# Patient Record
Sex: FEMALE | Race: WHITE | HISPANIC OR LATINO | Employment: FULL TIME | URBAN - NONMETROPOLITAN AREA
[De-identification: names, ages, dates, MRNs, and addresses within clinical notes are randomized per-mention and may not be internally consistent; named-entity substitution may affect disease eponyms.]

---

## 2020-08-15 ENCOUNTER — HOSPITAL ENCOUNTER (EMERGENCY)
Facility: HOSPITAL | Age: 60
Discharge: HOME | End: 2020-08-15
Attending: EMERGENCY MEDICINE | Admitting: EMERGENCY MEDICINE
Payer: COMMERCIAL

## 2020-08-15 ENCOUNTER — APPOINTMENT (OUTPATIENT)
Dept: RADIOLOGY | Facility: HOSPITAL | Age: 60
End: 2020-08-15
Payer: COMMERCIAL

## 2020-08-15 VITALS
SYSTOLIC BLOOD PRESSURE: 140 MMHG | HEART RATE: 86 BPM | TEMPERATURE: 98.2 F | DIASTOLIC BLOOD PRESSURE: 94 MMHG | RESPIRATION RATE: 20 BRPM | OXYGEN SATURATION: 98 %

## 2020-08-15 DIAGNOSIS — R06.02 SHORTNESS OF BREATH: Primary | ICD-10-CM

## 2020-08-15 DIAGNOSIS — J45.31 MILD PERSISTENT ASTHMA WITH EXACERBATION: ICD-10-CM

## 2020-08-15 LAB
ALBUMIN SERPL-MCNC: 4.3 G/DL (ref 3.5–5.3)
ALP SERPL-CCNC: 70 U/L (ref 46–118)
ALT SERPL-CCNC: 20 U/L (ref 7–52)
ANION GAP SERPL CALC-SCNC: 10 MMOL/L (ref 3–11)
AST SERPL-CCNC: 22 U/L
BASOPHILS # BLD AUTO: 0.1 10*3/UL
BASOPHILS NFR BLD AUTO: 1 % (ref 0–2)
BILIRUB SERPL-MCNC: 0.67 MG/DL (ref 0.2–1.4)
BUN SERPL-MCNC: 20 MG/DL (ref 7–25)
CALCIUM ALBUM COR SERPL-MCNC: 8.9 MG/DL (ref 8.6–10.3)
CALCIUM SERPL-MCNC: 9.1 MG/DL (ref 8.6–10.3)
CHLORIDE SERPL-SCNC: 105 MMOL/L (ref 98–107)
CO2 SERPL-SCNC: 27 MMOL/L (ref 21–32)
CREAT SERPL-MCNC: 0.84 MG/DL (ref 0.6–1.1)
EOSINOPHIL # BLD AUTO: 0.8 10*3/UL
EOSINOPHIL NFR BLD AUTO: 11 % (ref 0–3)
ERYTHROCYTE [DISTWIDTH] IN BLOOD BY AUTOMATED COUNT: 13.4 % (ref 11.5–14)
GFR SERPL CREATININE-BSD FRML MDRD: 77 ML/MIN/1.73M*2
GLUCOSE SERPL-MCNC: 107 MG/DL (ref 70–105)
HCT VFR BLD AUTO: 40 % (ref 34–45)
HGB BLD-MCNC: 14 G/DL (ref 11.5–15.5)
LYMPHOCYTES # BLD AUTO: 2.7 10*3/UL
LYMPHOCYTES NFR BLD AUTO: 36 % (ref 11–47)
MCH RBC QN AUTO: 32.4 PG (ref 28–33)
MCHC RBC AUTO-ENTMCNC: 35 G/DL (ref 32–36)
MCV RBC AUTO: 92.6 FL (ref 81–97)
MONOCYTES # BLD AUTO: 0.6 10*3/UL
MONOCYTES NFR BLD AUTO: 9 % (ref 3–11)
NEUTROPHILS # BLD AUTO: 3.2 10*3/UL
NEUTROPHILS NFR BLD AUTO: 44 % (ref 41–81)
PLATELET # BLD AUTO: 197 10*3/UL (ref 140–350)
PMV BLD AUTO: 9.5 FL (ref 6.9–10.8)
POTASSIUM SERPL-SCNC: 3.6 MMOL/L (ref 3.5–5.1)
PROT SERPL-MCNC: 7 G/DL (ref 6–8.3)
RBC # BLD AUTO: 4.32 10*6/ΜL (ref 3.7–5.3)
SARS-COV-2 RDRP RESP QL NAA+PROBE: NEGATIVE
SODIUM SERPL-SCNC: 142 MMOL/L (ref 135–145)
WBC # BLD AUTO: 7.4 10*3/UL (ref 4.5–10.5)

## 2020-08-15 PROCEDURE — 6360000200 HC RX 636 W HCPCS (ALT 250 FOR IP): Performed by: EMERGENCY MEDICINE

## 2020-08-15 PROCEDURE — 93005 ELECTROCARDIOGRAM TRACING: CPT | Performed by: EMERGENCY MEDICINE

## 2020-08-15 PROCEDURE — 80053 COMPREHEN METABOLIC PANEL: CPT | Performed by: EMERGENCY MEDICINE

## 2020-08-15 PROCEDURE — 87635 SARS-COV-2 COVID-19 AMP PRB: CPT | Performed by: EMERGENCY MEDICINE

## 2020-08-15 PROCEDURE — 96374 THER/PROPH/DIAG INJ IV PUSH: CPT

## 2020-08-15 PROCEDURE — 99284 EMERGENCY DEPT VISIT MOD MDM: CPT | Mod: 25 | Performed by: EMERGENCY MEDICINE

## 2020-08-15 PROCEDURE — 85025 COMPLETE CBC W/AUTO DIFF WBC: CPT | Performed by: EMERGENCY MEDICINE

## 2020-08-15 PROCEDURE — 71045 X-RAY EXAM CHEST 1 VIEW: CPT

## 2020-08-15 PROCEDURE — 94640 AIRWAY INHALATION TREATMENT: CPT

## 2020-08-15 PROCEDURE — C9803 HOPD COVID-19 SPEC COLLECT: HCPCS | Performed by: EMERGENCY MEDICINE

## 2020-08-15 PROCEDURE — 99283 EMERGENCY DEPT VISIT LOW MDM: CPT | Performed by: EMERGENCY MEDICINE

## 2020-08-15 PROCEDURE — 36415 COLL VENOUS BLD VENIPUNCTURE: CPT | Performed by: EMERGENCY MEDICINE

## 2020-08-15 PROCEDURE — 6370000100 HC RX 637 (ALT 250 FOR IP): Performed by: EMERGENCY MEDICINE

## 2020-08-15 RX ORDER — METHYLPREDNISOLONE 4 MG/1
4 TABLET ORAL
Status: DISCONTINUED | OUTPATIENT
Start: 2020-08-17 | End: 2020-08-15 | Stop reason: HOSPADM

## 2020-08-15 RX ORDER — ALBUTEROL SULFATE 90 UG/1
2 INHALANT RESPIRATORY (INHALATION) EVERY 6 HOURS PRN
COMMUNITY

## 2020-08-15 RX ORDER — METHYLPREDNISOLONE 4 MG/1
4 TABLET ORAL 2 TIMES DAILY
Status: DISCONTINUED | OUTPATIENT
Start: 2020-08-19 | End: 2020-08-15 | Stop reason: HOSPADM

## 2020-08-15 RX ORDER — METHYLPREDNISOLONE 4 MG/1
4 TABLET ORAL
Status: DISCONTINUED | OUTPATIENT
Start: 2020-08-18 | End: 2020-08-15 | Stop reason: HOSPADM

## 2020-08-15 RX ORDER — AZITHROMYCIN 250 MG
1 TABLET ORAL ONCE
Status: COMPLETED | OUTPATIENT
Start: 2020-08-15 | End: 2020-08-15

## 2020-08-15 RX ORDER — ALBUTEROL SULFATE 90 UG/1
2 INHALANT RESPIRATORY (INHALATION) EVERY 4 HOURS PRN
Status: DISCONTINUED | OUTPATIENT
Start: 2020-08-15 | End: 2020-08-15 | Stop reason: HOSPADM

## 2020-08-15 RX ORDER — IPRATROPIUM BROMIDE AND ALBUTEROL SULFATE 2.5; .5 MG/3ML; MG/3ML
3 SOLUTION RESPIRATORY (INHALATION) ONCE
Status: COMPLETED | OUTPATIENT
Start: 2020-08-15 | End: 2020-08-15

## 2020-08-15 RX ORDER — METHYLPREDNISOLONE 4 MG/1
8 TABLET ORAL ONCE
Status: DISCONTINUED | OUTPATIENT
Start: 2020-08-16 | End: 2020-08-15 | Stop reason: HOSPADM

## 2020-08-15 RX ORDER — METHYLPREDNISOLONE 4 MG/1
4 TABLET ORAL ONCE
Status: DISCONTINUED | OUTPATIENT
Start: 2020-08-20 | End: 2020-08-15 | Stop reason: HOSPADM

## 2020-08-15 RX ORDER — METHYLPREDNISOLONE 4 MG/1
TABLET ORAL
Status: DISCONTINUED
Start: 2020-08-15 | End: 2020-08-15 | Stop reason: HOSPADM

## 2020-08-15 RX ORDER — IPRATROPIUM BROMIDE AND ALBUTEROL SULFATE 2.5; .5 MG/3ML; MG/3ML
SOLUTION RESPIRATORY (INHALATION)
Status: DISCONTINUED
Start: 2020-08-15 | End: 2020-08-15 | Stop reason: HOSPADM

## 2020-08-15 RX ORDER — METHYLPREDNISOLONE 4 MG/1
4 TABLET ORAL
Status: DISCONTINUED | OUTPATIENT
Start: 2020-08-16 | End: 2020-08-15 | Stop reason: HOSPADM

## 2020-08-15 RX ADMIN — METHYLPREDNISOLONE SODIUM SUCCINATE 80 MG: 125 INJECTION, POWDER, FOR SOLUTION INTRAMUSCULAR; INTRAVENOUS at 05:35

## 2020-08-15 RX ADMIN — Medication 1 PACKAGE: at 05:40

## 2020-08-15 RX ADMIN — ALBUTEROL SULFATE 2 PUFF: 90 AEROSOL, METERED RESPIRATORY (INHALATION) at 04:21

## 2020-08-15 RX ADMIN — IPRATROPIUM BROMIDE AND ALBUTEROL SULFATE 3 ML: .5; 3 SOLUTION RESPIRATORY (INHALATION) at 05:35

## 2020-08-15 ASSESSMENT — ENCOUNTER SYMPTOMS
FEVER: 0
NAUSEA: 0
DIZZINESS: 0
COUGH: 1
VOMITING: 0
SHORTNESS OF BREATH: 1
TROUBLE SWALLOWING: 0
BACK PAIN: 0
CHILLS: 0
PALPITATIONS: 0
SORE THROAT: 1
ABDOMINAL PAIN: 0

## 2020-08-15 NOTE — ED PROVIDER NOTES
HPI:  Chief Complaint   Patient presents with   • Shortness of Breath     started 1 hour ago       Patient comes in [facility] for evaluation of difficulty breathing.  She was awakened from sleep about 1 to 1-1/2 hours ago with sudden onset of shortness of breath.  She has had some coughing.  She used her albuterol inhaler which did not help.  He is unsure whether she has had a fever or not but has felt hot.  She had a similar episode a few days ago and had her doctor call in a prescription for albuterol inhaler which she picked up in [place].  She has a slight sore throat now.  There is been no nasal congestion or stuffiness.  She has some soreness in the ribs with cough but no other chest pain or heaviness.  Has been no abdominal pain, vomiting, or diarrhea.  There are no known exposures to COVID-19 patients.  No recent lower extremity swelling.  There is no known trauma or injury.  She feels she may have been exposed to some smoke earlier.          HISTORY:  Past Medical History:   Diagnosis Date   • Asthma        No past surgical history on file.    No family history on file.    Social History     Tobacco Use   • Smoking status: Never Smoker   • Smokeless tobacco: Never Used   Substance Use Topics   • Alcohol use: Yes     Alcohol/week: 3.0 standard drinks     Types: 3 Shots of liquor per week   • Drug use: Never         ROS:  Review of Systems   Constitutional: Negative for chills and fever.   HENT: Positive for sore throat. Negative for congestion and trouble swallowing.    Respiratory: Positive for cough and shortness of breath.    Cardiovascular: Negative for chest pain, palpitations and leg swelling.   Gastrointestinal: Negative for abdominal pain, nausea and vomiting.   Musculoskeletal: Negative for back pain.   Skin: Negative for rash.   Neurological: Negative for dizziness.       PE:  ED Triage Vitals   Temp Heart Rate Resp BP SpO2   08/15/20 0414 08/15/20 0413 08/15/20 0413 08/15/20 0413 08/15/20 0413    36.8 °C (98.2 °F) 78 24 (!) 167/116 100 %      Temp Source Heart Rate Source Patient Position BP Location FiO2 (%)   08/15/20 0414 -- 08/15/20 0537 -- --   Oral  Head of bed 30 degrees or higher         Physical Exam  Vitals signs and nursing note reviewed.   Constitutional:       Appearance: She is obese.      Comments: Slightly anxious appearing   HENT:      Head: Normocephalic and atraumatic.      Mouth/Throat:      Mouth: Mucous membranes are moist.   Eyes:      Extraocular Movements: Extraocular movements intact.      Pupils: Pupils are equal, round, and reactive to light.   Neck:      Musculoskeletal: Normal range of motion.   Cardiovascular:      Rate and Rhythm: Normal rate.   Pulmonary:      Effort: Pulmonary effort is normal. No respiratory distress.      Breath sounds: No stridor. Examination of the right-upper field reveals rhonchi. Examination of the left-upper field reveals rhonchi. Examination of the right-middle field reveals rhonchi. Examination of the left-middle field reveals rhonchi. Rhonchi present.   Chest:      Chest wall: No deformity or tenderness.   Abdominal:      Palpations: Abdomen is soft.      Tenderness: There is no abdominal tenderness.   Musculoskeletal:      Right lower leg: No edema.      Left lower leg: No edema.   Skin:     General: Skin is warm and dry.      Findings: No rash.   Neurological:      General: No focal deficit present.      Mental Status: She is alert and oriented to person, place, and time.   Psychiatric:         Mood and Affect: Mood is anxious.         ED LABS:  Labs Reviewed   CBC WITH AUTO DIFFERENTIAL - Abnormal       Result Value    WBC 7.4      RBC 4.32      Hemoglobin 14.0      Hematocrit 40.0      MCV 92.6      MCH 32.4      MCHC 35.0      RDW 13.4      Platelets 197      MPV 9.5      Neutrophils% 44      Lymphocytes% 36      Monocytes% 9      Eosinophils% 11 (*)     Basophils% 1      Neutrophils Absolute 3.20      Lymphocytes Absolute 2.70       Monocytes Absolute 0.60      Eosinophils Absolute 0.80      Basophils Absolute 0.10     COMPREHENSIVE METABOLIC PANEL - Abnormal    Sodium 142      Potassium 3.6      Chloride 105      CO2 27      Anion Gap 10      BUN 20      Creatinine 0.84      Glucose 107 (*)     Calcium 9.1      AST 22      ALT (SGPT) 20      Alkaline Phosphatase 70      Total Protein 7.0      Albumin 4.3      Total Bilirubin 0.67      eGFR 77      Corrected Calcium 8.9      Narrative:     Estimated GFR calculated using the 2009 CKD-EPI creatinine equation.   COVID-19 MOLECULAR (PCR) - Normal    COVID-19 PCR Negative      Narrative:     A negative result may not rule out COVID-19 in the patient, as the sensitivity of the test depends on the timing of the specimen collection, type of specimen, and the quality of the specimen. Result should be correlated with patient's history and clinical presentation.    Negative results should be treated as presumptive and, if inconsistent with clinical signs and symptoms or necessary for patient management, should be tested with an alternative molecular assay.    Performance of this SARS-CoV-2 RNA test has only been established in individuals suspected of having COVID-19 by their healthcare provider.    Testing was performed using the ID NOW COVID-19 assay ([employer]) that detects the SARS coronavirus 2 RdRp gene utilizing isothermal nucleic acid amplification.   Fact sheets for this Emergency Use Authorization (EUA) assay can be found at the following links:  For Healthcare Providers  https://www.fda.gov/media/899700/download  For Patients  https://www.fda.gov/media/717057/download         ED IMAGES:  XR chest portable 1 view   ED Interpretation   No acute disease          ED PROCEDURES:  ECG 12 lead    Date/Time: 8/15/2020 5:55 AM  Performed by: Lino Vega MD  Authorized by: Lino Vega MD     Previous ECG:     Previous ECG:  Unavailable  Interpretation:     Interpretation: non-specific     Comments:      Sinus rhythm rate 69 normal axis and intervals, borderline T wave abnormalities in the anterior lateral leads likely flattening of these T waves.  Normal QT/QTc        ED COURSE:  ED Course as of Aug 15 0543   Sat Aug 15, 2020   0429 Patient seen and examined.  Cough shortness of breath we will test her for COVID-19.  She does have history of asthma but has no wheezing on exam at this time but does have some slightly decreased air flow.  Oxygen saturations are normal.  Given this is her second exacerbation in less than a week's time we will consider steroids to prevent rebound but this will be based on the COVID-19 test.    [DC]   0522 Labs reviewed with patient.  COVID-19 test is negative.  She is advised there can be false negative tests and that she should be prudent in social distancing in isolation for the next 14 days.  This is until she is at least 72 hours symptom-free.  Given this is her second attack in less than a week we will start steroids.    [DC]   0535 Doubt pneumonia given chest x-ray findings.    [DC]      ED Course User Index  [DC] Lino Vega MD            MDM:  MDM  Number of Diagnoses or Management Options  Mild persistent asthma with exacerbation:   Shortness of breath:   Diagnosis management comments: Patient shows improvement in breathing.  Feels much improved than when she came in.  Likely exacerbation from asthma.  She feels she may have been exposed to some smoke earlier.  Chest x-ray shows no pulmonary vascular congestion to suggest CHF.  There are no obvious infiltrates to suggest pneumonia.  She is afebrile with normal oxygen saturations.  Doubt DVT/PE given the lack of lower extremity swelling and normal oxygen saturations.  She can be treated as an outpatient with antibiotics, steroids, and rescue inhaler.  Patient tested negative for COVID-19 but self-isolation is still recommended.  Doubt acute coronary syndrome.       Amount and/or Complexity of Data  Reviewed  Clinical lab tests: reviewed  Tests in the radiology section of CPT®: ordered and reviewed  Tests in the medicine section of CPT®: reviewed        Final diagnoses:   [R06.02] Shortness of breath   [J45.31] Mild persistent asthma with exacerbation        Lino Vega MD  08/15/20 0544       Lino Vega MD  08/15/20 0556

## 2020-08-15 NOTE — DISCHARGE INSTRUCTIONS
Your x-ray does not show any obvious or large pneumonias however a board-certified radiologist will be reading the image in the morning and should there be a change or additional finding we will notify you.    You have been given steroids in the emergency department and we will put you on a Medrol Dosepak for 1 week which should prevent this from flaring up again.  Please take this once a day with your evening meal.    Your COVID-19 test was negative.  Nevertheless there can be false negatives on any test and you should self isolate from others until you are 72 hours symptom-free.    You may use your inhaler with the AeroChamber (plastic spacer tube) 2 to 4 puffs every 4-6 hours as needed for difficulty breathing.  Return to emergency for severe chest pain, worsening difficulty breathing, high fever associated or any new concerns.    Your blood pressure is mild to moderately elevated tonight.  Please check several times over the next few weeks and discuss with your doctor if persistently elevated over 140/80.

## 2020-08-16 PROCEDURE — 93010 ELECTROCARDIOGRAM REPORT: CPT | Mod: 77 | Performed by: INTERNAL MEDICINE

## 2021-09-29 ENCOUNTER — APPOINTMENT (RX ONLY)
Dept: URBAN - METROPOLITAN AREA CLINIC 51 | Facility: CLINIC | Age: 61
Setting detail: DERMATOLOGY
End: 2021-09-29

## 2021-09-29 DIAGNOSIS — D22 MELANOCYTIC NEVI: ICD-10-CM

## 2021-09-29 DIAGNOSIS — L72.0 EPIDERMAL CYST: ICD-10-CM

## 2021-09-29 PROBLEM — D22.9 MELANOCYTIC NEVI, UNSPECIFIED: Status: ACTIVE | Noted: 2021-09-29

## 2021-09-29 PROBLEM — D48.5 NEOPLASM OF UNCERTAIN BEHAVIOR OF SKIN: Status: ACTIVE | Noted: 2021-09-29

## 2021-09-29 PROCEDURE — ? TREATMENT REGIMEN

## 2021-09-29 PROCEDURE — 99203 OFFICE O/P NEW LOW 30 MIN: CPT

## 2021-09-29 PROCEDURE — ? COUNSELING

## 2021-10-21 ENCOUNTER — APPOINTMENT (RX ONLY)
Dept: URBAN - METROPOLITAN AREA CLINIC 51 | Facility: CLINIC | Age: 61
Setting detail: DERMATOLOGY
End: 2021-10-21

## 2021-10-21 DIAGNOSIS — D485 NEOPLASM OF UNCERTAIN BEHAVIOR OF SKIN: ICD-10-CM

## 2021-10-21 DIAGNOSIS — Z71.89 OTHER SPECIFIED COUNSELING: ICD-10-CM

## 2021-10-21 DIAGNOSIS — D22 MELANOCYTIC NEVI: ICD-10-CM

## 2021-10-21 DIAGNOSIS — D18.0 HEMANGIOMA: ICD-10-CM

## 2021-10-21 PROBLEM — D48.5 NEOPLASM OF UNCERTAIN BEHAVIOR OF SKIN: Status: ACTIVE | Noted: 2021-10-21

## 2021-10-21 PROBLEM — D22.9 MELANOCYTIC NEVI, UNSPECIFIED: Status: ACTIVE | Noted: 2021-10-21

## 2021-10-21 PROBLEM — D18.01 HEMANGIOMA OF SKIN AND SUBCUTANEOUS TISSUE: Status: ACTIVE | Noted: 2021-10-21

## 2021-10-21 PROCEDURE — ? SUNSCREEN RECOMMENDATIONS

## 2021-10-21 PROCEDURE — ? COUNSELING

## 2021-10-21 PROCEDURE — 11104 PUNCH BX SKIN SINGLE LESION: CPT

## 2021-10-21 PROCEDURE — ? BIOPSY BY PUNCH METHOD

## 2021-10-21 PROCEDURE — 99213 OFFICE O/P EST LOW 20 MIN: CPT | Mod: 25

## 2021-10-21 ASSESSMENT — LOCATION DETAILED DESCRIPTION DERM: LOCATION DETAILED: LEFT ELBOW

## 2021-10-21 ASSESSMENT — LOCATION SIMPLE DESCRIPTION DERM: LOCATION SIMPLE: LEFT ELBOW

## 2021-10-21 ASSESSMENT — LOCATION ZONE DERM: LOCATION ZONE: ARM

## 2021-10-21 NOTE — PROCEDURE: BIOPSY BY PUNCH METHOD
Body Location Override (Optional - Billing Will Still Be Based On Selected Body Map Location If Applicable): left elbow
Detail Level: Detailed
Was A Bandage Applied: Yes
Punch Size In Mm: 8
Biopsy Type: H and E
Anesthesia Type: 1% lidocaine with epinephrine
Anesthesia Volume In Cc (Will Not Render If 0): 1
Additional Anesthesia Volume In Cc (Will Not Render If 0): 0
Hemostasis: None
Epidermal Sutures: 3-0 Nylon
Wound Care: Bacitracin
Dressing: bandage
Suture Removal: 14 days
Patient Will Remove Sutures At Home?: No
Path Notes (To The Dermatopathologist): R/o:  NUB\\nSize: 8 mm
Consent: Written consent was obtained and risks were reviewed including but not limited to scarring, infection, bleeding, scabbing, incomplete removal, nerve damage and allergy to anesthesia.
Post-Care Instructions: I reviewed with the patient in detail post-care instructions. Patient is to keep the biopsy site dry overnight, and then apply bacitracin twice daily until healed. Patient may apply hydrogen peroxide soaks to remove any crusting.
Home Suture Removal Text: Patient was provided a home suture removal kit and will remove their sutures at home. If they have any questions or difficulties they will call the office.
Notification Instructions: Patient will be notified of biopsy results. However, patient instructed to call the office if not contacted within 2 weeks.
Billing Type: [employer]
Information: Selecting Yes will display possible errors in your note based on the variables you have selected. This validation is only offered as a suggestion for you. PLEASE NOTE THAT THE VALIDATION TEXT WILL BE REMOVED WHEN YOU FINALIZE YOUR NOTE. IF YOU WANT TO FAX A PRELIMINARY NOTE YOU WILL NEED TO TOGGLE THIS TO 'NO' IF YOU DO NOT WANT IT IN YOUR FAXED NOTE.

## 2021-11-05 ENCOUNTER — APPOINTMENT (RX ONLY)
Dept: URBAN - METROPOLITAN AREA CLINIC 51 | Facility: CLINIC | Age: 61
Setting detail: DERMATOLOGY
End: 2021-11-05

## 2021-11-05 DIAGNOSIS — Z48.02 ENCOUNTER FOR REMOVAL OF SUTURES: ICD-10-CM

## 2021-11-05 PROCEDURE — 99024 POSTOP FOLLOW-UP VISIT: CPT

## 2021-11-05 PROCEDURE — ? SUTURE REMOVAL (GLOBAL PERIOD)

## 2021-11-05 ASSESSMENT — LOCATION ZONE DERM: LOCATION ZONE: ARM

## 2021-11-05 ASSESSMENT — LOCATION DETAILED DESCRIPTION DERM: LOCATION DETAILED: LEFT ELBOW

## 2021-11-05 ASSESSMENT — LOCATION SIMPLE DESCRIPTION DERM: LOCATION SIMPLE: LEFT ELBOW

## 2021-11-05 NOTE — PROCEDURE: SUTURE REMOVAL (GLOBAL PERIOD)
Body Location Override (Optional - Billing Will Still Be Based On Selected Body Map Location If Applicable): left elbow
Detail Level: Detailed
Add 92530 Cpt? (Important Note: In 2017 The Use Of 02626 Is Being Tracked By Cms To Determine Future Global Period Reimbursement For Global Periods): yes
Structured MSE